# Patient Record
Sex: FEMALE | Race: BLACK OR AFRICAN AMERICAN | NOT HISPANIC OR LATINO | Employment: UNEMPLOYED | URBAN - METROPOLITAN AREA
[De-identification: names, ages, dates, MRNs, and addresses within clinical notes are randomized per-mention and may not be internally consistent; named-entity substitution may affect disease eponyms.]

---

## 2024-08-02 ENCOUNTER — HOSPITAL ENCOUNTER (OUTPATIENT)
Dept: RADIOLOGY | Facility: HOSPITAL | Age: 59
Discharge: HOME | End: 2024-08-02
Attending: NURSE PRACTITIONER
Payer: MEDICAID

## 2024-08-02 ENCOUNTER — OFFICE VISIT (OUTPATIENT)
Dept: FAMILY MEDICINE | Facility: CLINIC | Age: 59
End: 2024-08-02
Payer: MEDICAID

## 2024-08-02 VITALS
TEMPERATURE: 98 F | SYSTOLIC BLOOD PRESSURE: 122 MMHG | OXYGEN SATURATION: 97 % | BODY MASS INDEX: 44.85 KG/M2 | DIASTOLIC BLOOD PRESSURE: 76 MMHG | RESPIRATION RATE: 18 BRPM | HEIGHT: 62 IN | WEIGHT: 243.69 LBS | HEART RATE: 61 BPM

## 2024-08-02 DIAGNOSIS — E66.9 OBESITY, UNSPECIFIED CLASSIFICATION, UNSPECIFIED OBESITY TYPE, UNSPECIFIED WHETHER SERIOUS COMORBIDITY PRESENT: ICD-10-CM

## 2024-08-02 DIAGNOSIS — G89.29 CHRONIC PAIN OF LEFT ANKLE: ICD-10-CM

## 2024-08-02 DIAGNOSIS — M25.572 CHRONIC PAIN OF LEFT ANKLE: ICD-10-CM

## 2024-08-02 DIAGNOSIS — Z13.31 POSITIVE DEPRESSION SCREENING: ICD-10-CM

## 2024-08-02 DIAGNOSIS — Z00.00 WELL ADULT EXAM: ICD-10-CM

## 2024-08-02 DIAGNOSIS — Z76.89 ENCOUNTER TO ESTABLISH CARE: Primary | ICD-10-CM

## 2024-08-02 PROCEDURE — 73610 X-RAY EXAM OF ANKLE: CPT | Mod: TC,LT

## 2024-08-02 RX ORDER — DICLOFENAC SODIUM 50 MG/1
50 TABLET, DELAYED RELEASE ORAL 2 TIMES DAILY PRN
Qty: 60 TABLET | Refills: 3 | Status: SHIPPED | OUTPATIENT
Start: 2024-08-02

## 2024-08-02 RX ORDER — METFORMIN HYDROCHLORIDE 500 MG/1
500 TABLET, EXTENDED RELEASE ORAL 2 TIMES DAILY WITH MEALS
COMMUNITY
Start: 2024-04-09

## 2024-08-02 NOTE — PATIENT INSTRUCTIONS
Kiel Rodriguez,     If you are due for any health screening(s) below please notify me so we can arrange them to be ordered and scheduled. Most healthy patients at your age complete them, but you are free to accept or refuse.     If you can't do it, I'll definitely understand. If you can, I'd certainly appreciate it!    Tests to Keep You Healthy    Mammogram: DUE  Colon Cancer Screening: DUE      Schedule your breast cancer screening today     Breast cancer is the second most common cancer in women,  and the second leading cause of death from cancer. Mammograms can detect breast cancer early, which significantly increases the chances of curing the cancer.       Our records indicate that you may be overdue for breast cancer screening. Cancer screenings save lives, so schedule yours today to stay healthy.     If you recently had a mammogram performed outside of [facility], please let your Health care team know so that they can update your health record.        Its time for your colon cancer screening     Colorectal cancer is one of the leading causes of cancer death for men and women but it doesnt have to be. Screenings can prevent colorectal cancer or find it early enough to treat and cure the disease.     Our records indicate that you may be overdue for colon cancer screening. A colonoscopy or stool screening test can help identify patients at risk for developing colon cancer. Cancer screenings save lives, so schedule yours today to stay healthy.     A colonoscopy is the preferred test for detecting colon cancer. It is needed only once every 10 years if results are negative. While you are sedated, a flexible, lighted tube with a tiny camera is inserted into the rectum and advanced through the colon to look for cancers.     An alternative screening test that is used at home and returned to the lab may also be used. It detects hidden blood in bowel movements which could indicate cancer in the colon. If results  are positive, you will need a colonoscopy to determine if the blood is a sign of cancer. This type of follow up (diagnostic) colonoscopy usually requires additional copays as required by your insurance provider.     If you recently had your colon cancer screening performed outside of [facility], please let your Health care team know so that they can update your health record. Please contact your PCP if you have any questions.

## 2024-08-02 NOTE — PROGRESS NOTES
"   Patient ID: [id number]     Chief Complaint: Establish Care      HPI:     Jennifer Jackson is a 58 y.o. female here today to establish care.  [place]  She presents with complaints of left chronic left ankle pain.  Pain is worse with ambulation.  Patient rates pain 4/10.  Patient describes pain as an intermittent, sharp, shooting pain.  Patient denies any trauma or injury.  Patient reports relief with ankle boot.  Denies relief with ibuprofen.        Health Maintenance   Topic Date Due    Hepatitis C Screening  Never done    Mammogram  Never done    TETANUS VACCINE  08/11/2007    Colorectal Cancer Screening  Never done    Shingles Vaccine (1 of 2) Never done    Lipid Panel  08/02/2029       Past Medical History:  has a past medical history of Obesity, unspecified.    Surgical History:  has a past surgical history that includes Tubal ligation; Hysterectomy; Tonsillectomy; [place] tooth extraction; and Vaginal delivery.    Family History: family history is not on file.    Social History:  reports that she has never smoked. She has never used smokeless tobacco. She reports current alcohol use. She reports that she does not use drugs.    Current Outpatient Medications   Medication Instructions    diclofenac (VOLTAREN) 50 mg, Oral, 2 times daily PRN    metFORMIN (GLUCOPHAGE-XR) 500 mg, Oral, 2 times daily with meals       Patient is allergic to ketorolac and tromethamine.     Patient Care Team:  Tere Gilbert FNP as PCP - General (Family Medicine)       Subjective:     Review of Systems    12 point review of systems conducted, negative except as stated in the history of present illness. See HPI for details.      Objective:     Visit Vitals  /76 (BP Location: Left arm, Patient Position: Sitting)   Pulse 61   Temp 98.1 °F (36.7 °C) (Oral)   Resp 18   Ht 5' 2" (1.575 m)   Wt 110.5 kg (243 lb 11.2 oz)   SpO2 97%   BMI 44.57 kg/m²       Physical Exam    General: Alert and oriented, No " "acute distress.  Head: Normocephalic, Atraumatic.  Eye: Pupils are equal, round and reactive to light, Extraocular movements are intact, Sclera non-icteric.  Ears/Nose/Throat: Normal, Mucosa moist,Clear.  Neck/Thyroid: Supple, Non-tender, No carotid bruit, No lymphadenopathy, No JVD, Full range of motion.  Respiratory: Clear to auscultation bilaterally; No wheezes, rales or rhonchi,Non-labored respirations, Symmetrical chest wall expansion.  Cardiovascular: Regular rate and rhythm, S1/S2 normal, No murmurs, rubs or gallops.  Gastrointestinal: Soft, Non-tender, Non-distended, Normal bowel sounds, No palpable organomegaly.  Musculoskeletal:  Left foot boot in place.  Integumentary: Warm, Dry, Intact, No suspicious lesions or rashes.  Extremities: No clubbing, cyanosis or edema  Neurologic: No focal deficits, Cranial Nerves II-XII are grossly intact, Motor strength normal upper and lower extremities, Sensory exam intact.  Psychiatric: Normal interaction, Coherent speech, Euthymic mood, Appropriate affect     Labs Reviewed:     Chemistry:  Lab Results   Component Value Date     08/02/2024    K 4.4 08/02/2024    BUN 22.3 (H) 08/02/2024    CREATININE 0.82 08/02/2024    EGFRNORACEVR >60 08/02/2024    GLUCOSE 132 (H) 08/02/2024    CALCIUM 9.2 08/02/2024    ALKPHOS 83 08/02/2024    LABPROT 7.7 08/02/2024    ALBUMIN 3.9 08/02/2024    AST 15 08/02/2024    ALT 14 08/02/2024        Lab Results   Component Value Date    HGBA1C 6.3 08/02/2024        Hematology:  Lab Results   Component Value Date    WBC 7.71 08/02/2024    HGB 13.5 08/02/2024    HCT 42.0 08/02/2024     08/02/2024       Lipid Panel:  Lab Results   Component Value Date    CHOL 181 08/02/2024    HDL 44 08/02/2024    .00 08/02/2024    TRIG 107 08/02/2024    TOTALCHOLEST 4 08/02/2024        Urine:  No results found for: "COLORUA", "APPEARANCEUA", "SGUA", "PHUA", "PROTEINUA", "GLUCOSEUA", "KETONESUA", "BLOODUA", "NITRITESUA", "LEUKOCYTESUR", "RBCUA", " ""WBCUA", "BACTERIA", "SQEPUA", "HYALINECASTS", "CREATRANDUR", "PROTEINURINE", "UPROTCREA"       Assessment:       ICD-10-CM ICD-9-CM   1. Encounter to establish care  Z76.89 V65.8   2. Positive depression screening  Z13.31 796.4   3. Chronic pain of left ankle  M25.572 719.47    G89.29 338.29   4. Well adult exam  Z00.00 V70.0   5. Obesity, unspecified classification, unspecified obesity type, unspecified whether serious comorbidity present  E66.9 278.00        Plan:   1. Encounter to establish care    2. Positive depression screening  Stable.  ED precautions given.  Discussed benefits of medication not becoming noticeable until up to 6 weeks from start date.   Exercise daily. Get sunlight daily.  Practice positive phrases and repeat throughout the day, along with yoga and relaxation techniques.  Establish good social support, make changes to reduce stress.  Reports any symptoms of suicidal/homicidal ideations or self harm immediately, if clinic is closed go to nearest emergency room.    3. Chronic pain of left ankle  - X-Ray Ankle Complete Left; Future  - diclofenac (VOLTAREN) 50 MG EC tablet; Take 1 tablet (50 mg total) by mouth 2 (two) times daily as needed (pain).  Dispense: 60 tablet; Refill: 3  - Ambulatory referral/consult to Orthopedics; Future    4. Well adult exam  - CBC Auto Differential; Future  - Comprehensive Metabolic Panel; Future  - Lipid Panel; Future  - TSH; Future  - Hemoglobin A1C; Future  - Urinalysis; Future  - Vitamin D; Future  - Microalbumin/Creatinine Ratio, Urine; Future  - Urinalysis     5. Obesity, unspecified classification, unspecified obesity type, unspecified whether serious comorbidity present  Body mass index is 44.57 kg/m².  Goal BMI <30.  Exercise 5 times a week for 30 minutes per day.  Avoid soda, simple sugars, excessive rice, potatoes or bread. Limit fast foods and fried foods.  Choose complex carbs in moderation (example: green vegetables, beans, oatmeal). Eat plenty of " fresh fruits and vegetables with lean meats daily.  Do not skip meals. Eat a balanced portion size.  Avoid fad diets. Consider permanent healthy life style changes.       Follow up in about 6 weeks (around 9/13/2024) for Wellness. In addition to their scheduled follow up, the patient has also been instructed to follow up on as needed basis.     Future Appointments   Date Time Provider Department Center   10/16/2024  3:00 PM Tere Gilbert FNP [facility]        [facility] Henry

## 2024-08-05 ENCOUNTER — TELEPHONE (OUTPATIENT)
Dept: FAMILY MEDICINE | Facility: CLINIC | Age: 59
End: 2024-08-05
Payer: MEDICAID

## 2024-08-05 DIAGNOSIS — M25.572 CHRONIC PAIN OF LEFT ANKLE: Primary | ICD-10-CM

## 2024-08-05 DIAGNOSIS — G89.29 CHRONIC PAIN OF LEFT ANKLE: Primary | ICD-10-CM

## 2024-08-19 ENCOUNTER — TELEPHONE (OUTPATIENT)
Dept: FAMILY MEDICINE | Facility: CLINIC | Age: 59
End: 2024-08-19
Payer: MEDICAID

## 2024-08-19 NOTE — TELEPHONE ENCOUNTER
----- Message from Liudmila Rees sent at 8/19/2024 10:59 AM CDT -----  Regarding: results  .Who Called: Jennifer Morrison    Caller is requesting information on test results.    Name of Test (Lab/Mammo/Etc): labs  Date of Test: 8/2/24  Where the test was performed: [facility]  Ordering Provider: Ofelia    Preferred Method of Contact: Phone Call  Patient's Preferred Phone Number on File: [phone]   Best Call Back Number, if different:  Additional Information: pt requesting a call back

## 2024-09-05 ENCOUNTER — OFFICE VISIT (OUTPATIENT)
Dept: ORTHOPEDICS | Facility: CLINIC | Age: 59
End: 2024-09-05
Payer: MEDICAID

## 2024-09-05 ENCOUNTER — HOSPITAL ENCOUNTER (OUTPATIENT)
Dept: RADIOLOGY | Facility: HOSPITAL | Age: 59
Discharge: HOME | End: 2024-09-05
Payer: MEDICAID

## 2024-09-05 VITALS
WEIGHT: 240.31 LBS | BODY MASS INDEX: 44.22 KG/M2 | HEIGHT: 62 IN | TEMPERATURE: 98 F | HEART RATE: 75 BPM | SYSTOLIC BLOOD PRESSURE: 128 MMHG | DIASTOLIC BLOOD PRESSURE: 79 MMHG

## 2024-09-05 DIAGNOSIS — M25.572 CHRONIC PAIN OF LEFT ANKLE: ICD-10-CM

## 2024-09-05 DIAGNOSIS — G89.29 CHRONIC PAIN OF LEFT ANKLE: ICD-10-CM

## 2024-09-05 DIAGNOSIS — M76.822 INSUFFICIENCY OF LEFT POSTERIOR TIBIAL TENDON: Primary | ICD-10-CM

## 2024-09-05 PROCEDURE — 73630 X-RAY EXAM OF FOOT: CPT | Mod: TC,LT

## 2024-09-05 PROCEDURE — 99214 OFFICE O/P EST MOD 30 MIN: CPT | Mod: PBBFAC,25

## 2024-09-05 PROCEDURE — 73610 X-RAY EXAM OF ANKLE: CPT | Mod: TC,LT

## 2024-09-05 NOTE — PROGRESS NOTES
"  Subjective:    Patient ID: Jennifer Jackson is a 58 y.o. female  who presented to [facility] for new visit..      Chief Complaint: Pain of the Left Ankle      History of Present Illness:  Jennifer Jackson presents to the clinic due to chronic left ankle/foot pain x years. presented today with anterior dorsal ankle pain and medial ankle pain. She ranks it a 4 out of 10 pain currently, increased over the last few months slightly. She describes the pain as constant ache. She denies any inciting event. Pain is aggravated by standing or extended periods of time and when she sleeps (she does sleep on her stomach at night). Patient was started on diclofenac po by her PCP but she had a rash secondary to the medication therefore it was discontinued. Patient has had no prior ankle/foot problems other than plantar fascitis that was treated with a walking boot about 2 hears ago. Evaluation to date: plain films and PCP evaluation. Treatment to date: avoidance of activity and oral analgesics. Expectations for today's visit includes discuss options to help decrease constant pain. PCP is JOEY Rosales.    Ankle/Foot Review of Systems:  Swelling?  no  Instability?  no  Mechanical sx?  no  <30 min AM stiffness? yes  Limited ROM? no  Fever/Chills? no      Objective:      Physical Exam:    /79   Pulse 75   Temp 97.6 °F (36.4 °C)   Ht 5' 2" (1.575 m)   Wt 109 kg (240 lb 4.8 oz)   BMI 43.95 kg/m²     Ortho/SPM Exam    Appearance:  Normal gait/station  FWB      Soft tissue swelling: Left: no Right: no  Effusion: Left:  Negative Right: Negative  Erythema: Left no Right: no  Ecchymosis: Left: no Right: no  Atrophy: Left: no Right: no    Palpation:  Ankle/Foot Tenderness: Left: dorsal, medial, and lateral medial malleolus Right: non tender.    Range of motion:  Ankle dorsiflexion (20 degrees):     Left: 20 Right: 20  Ankle Plantar flexion (50 degrees): Left 50 Right: " 50  Subtalar inversion (5 degrees): Left: 5 Right 5  Subtalar eversion (5 degrees):  Left: 5 Right 5  Transverse/midtarsal: adduction (20 degrees): Left: 20 Right: 20  Transverse/midtarsal abduction (10 degrees): Left: 10 Right: 10    Strength:  Foot inversion/dorsiflexion (Deep Peroneal N. L4):Left: 5/5  Pain: yes Right: 5/5  Pain: no  1st toe Dorsiflexion (Deep Peroneal N. L5): Left: 5/5  Pain: no Right: 5/5  Pain: no  Foot plantarflexion (Tibial N. S1): Left: 5/5  Pain: no Right: 5/5  Pain: no  Foot eversion (superficial peroneal L4-S1): Left: 5/5  Pain: yes Right: 5/5  Pain: no      Special Tests:  Mcgill:  Left: Not performed     Right: Not performed   Anterior drawer: Left: Negative     Right: Negative   Talar tilt: Left: Negative      Right: Negative   Squeeze: Left: Negative  Right: Negative  Heel rise: Left: Not performed Right: Not performed  Too many toes sign: Left: Positive Right: Negative      General appearance: NAD  Peripheral pulses: normal bilaterally   Reflexes: Left: normal Right normal   Sensation: normal    Labs:  Last A1c: 6.3     Imaging:   Previous images reviewed.  X-rays ordered and performed today: yes  # of views: 3 Laterality: left  My Interpretation:  no abnormality. No obvious fracture, abnormal joint space widening/narrowing, cortical defect or obvious soft tissue injury.     Assessment:        Encounter Diagnoses   Code Name Primary?    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 Prior external referring provider studies reviewed.   Dx:    Treatment Plan:   -Discussed with patient diagnosis, prognosis, and treatment recommendations.   -Over the counter NSAID and/or tylenol provided you do not have contraindications such as but not limited to liver or kidney disease or uncontrolled blood pressure. If you're doctors have told you to to not take them based on your health, do not take them.    -Discussed with the patient to begin using insoles in her shoes, she should be in these insoles whenever she is ambulating no matter if she is in the her home or outside.   -PT script given to the patient today  Imaging: radiological studies ordered and independently reviewed; discussed with patient; pending radiologist interpretation.   Weight Management: is paramount. Recommend to discuss with PCP about medication and bariatric surgery options for weight loss if your BMI is >35 and applicable. A BMI of <24.9 may provide further relief..   Activity: Activity as tolerated; HEP to include aerobic conditioning and strength training with non-painful activity. ROM/STG exercises. Proper footware.   Therapy: Physical Therapy  Medication: START over-the-counter acetaminophen (Tylenol 1000 mg three times per day as needed)  CONTINUE over-the-counter NSAIDs (ibuprofen 200mg three tablets three times a day as needed). Please see your primary care physician for further refills.  RTC: 3 months.      This note is dictated using the M*Modal Fluency Direct word recognition program. There are word recognition mistakes that are occasionally missed on review.     Lashay Ovalle MD  Sports Medicine Fellow

## 2024-10-10 ENCOUNTER — TELEPHONE (OUTPATIENT)
Dept: FAMILY MEDICINE | Facility: CLINIC | Age: 59
End: 2024-10-10
Payer: MEDICAID

## 2024-10-16 ENCOUNTER — LAB VISIT (OUTPATIENT)
Dept: LAB | Facility: HOSPITAL | Age: 59
End: 2024-10-16
Attending: NURSE PRACTITIONER
Payer: MEDICAID

## 2024-10-16 ENCOUNTER — OFFICE VISIT (OUTPATIENT)
Dept: FAMILY MEDICINE | Facility: CLINIC | Age: 59
End: 2024-10-16
Payer: MEDICAID

## 2024-10-16 VITALS
HEART RATE: 83 BPM | DIASTOLIC BLOOD PRESSURE: 85 MMHG | HEIGHT: 62 IN | WEIGHT: 246.5 LBS | TEMPERATURE: 98 F | SYSTOLIC BLOOD PRESSURE: 129 MMHG | BODY MASS INDEX: 45.36 KG/M2

## 2024-10-16 DIAGNOSIS — J30.2 SEASONAL ALLERGIES: ICD-10-CM

## 2024-10-16 DIAGNOSIS — Z12.31 ENCOUNTER FOR SCREENING MAMMOGRAM FOR BREAST CANCER: ICD-10-CM

## 2024-10-16 DIAGNOSIS — E66.9 OBESITY, UNSPECIFIED CLASS, UNSPECIFIED OBESITY TYPE, UNSPECIFIED WHETHER SERIOUS COMORBIDITY PRESENT: ICD-10-CM

## 2024-10-16 DIAGNOSIS — Z23 FLU VACCINE NEED: ICD-10-CM

## 2024-10-16 DIAGNOSIS — R73.03 PRE-DIABETES: ICD-10-CM

## 2024-10-16 DIAGNOSIS — Z00.00 WELLNESS EXAMINATION: Primary | ICD-10-CM

## 2024-10-16 DIAGNOSIS — M62.838 NECK MUSCLE SPASM: ICD-10-CM

## 2024-10-16 LAB
EST. AVERAGE GLUCOSE BLD GHB EST-MCNC: 139.9 MG/DL
HBA1C MFR BLD: 6.5 %

## 2024-10-16 PROCEDURE — 99396 PREV VISIT EST AGE 40-64: CPT | Mod: 25,,, | Performed by: NURSE PRACTITIONER

## 2024-10-16 PROCEDURE — 1160F RVW MEDS BY RX/DR IN RCRD: CPT | Mod: CPTII,,, | Performed by: NURSE PRACTITIONER

## 2024-10-16 PROCEDURE — 3008F BODY MASS INDEX DOCD: CPT | Mod: CPTII,,, | Performed by: NURSE PRACTITIONER

## 2024-10-16 PROCEDURE — 3074F SYST BP LT 130 MM HG: CPT | Mod: CPTII,,, | Performed by: NURSE PRACTITIONER

## 2024-10-16 PROCEDURE — 3044F HG A1C LEVEL LT 7.0%: CPT | Mod: CPTII,,, | Performed by: NURSE PRACTITIONER

## 2024-10-16 PROCEDURE — 1159F MED LIST DOCD IN RCRD: CPT | Mod: CPTII,,, | Performed by: NURSE PRACTITIONER

## 2024-10-16 PROCEDURE — 90656 IIV3 VACC NO PRSV 0.5 ML IM: CPT | Mod: ,,, | Performed by: NURSE PRACTITIONER

## 2024-10-16 PROCEDURE — 90471 IMMUNIZATION ADMIN: CPT | Mod: ,,, | Performed by: NURSE PRACTITIONER

## 2024-10-16 PROCEDURE — 3079F DIAST BP 80-89 MM HG: CPT | Mod: CPTII,,, | Performed by: NURSE PRACTITIONER

## 2024-10-16 PROCEDURE — 83036 HEMOGLOBIN GLYCOSYLATED A1C: CPT

## 2024-10-16 PROCEDURE — 36415 COLL VENOUS BLD VENIPUNCTURE: CPT

## 2024-10-16 RX ORDER — CYCLOBENZAPRINE HCL 5 MG
5 TABLET ORAL 3 TIMES DAILY PRN
Qty: 15 TABLET | Refills: 0 | Status: SHIPPED | OUTPATIENT
Start: 2024-10-16 | End: 2024-10-21

## 2024-10-16 RX ORDER — LORATADINE 10 MG/1
10 TABLET ORAL DAILY
Qty: 30 TABLET | Refills: 11 | Status: SHIPPED | OUTPATIENT
Start: 2024-10-16 | End: 2025-10-16

## 2024-10-16 RX ORDER — FLUTICASONE PROPIONATE 50 MCG
1 SPRAY, SUSPENSION (ML) NASAL 2 TIMES DAILY
Qty: 18.2 ML | Refills: 11 | Status: SHIPPED | OUTPATIENT
Start: 2024-10-16 | End: 2025-10-16

## 2024-10-16 NOTE — LETTER
AUTHORIZATION FOR RELEASE OF   CONFIDENTIAL INFORMATION    Dear [facility],    We are seeing Jennifer Jackson, date of birth 1965, in the clinic at [facility]. Tere Gilbert FNP is the patient's PCP. Jennifer Jackson has an outstanding lab/procedure at the time we reviewed her chart. In order to help keep her health information updated, she has authorized us to request the following medical record(s):        (  )  MAMMOGRAM                                      ( x )  COLONOSCOPY      (  )  PAP SMEAR                                          (  )  OUTSIDE LAB RESULTS     (  )  DEXA SCAN                                          (  )  EYE EXAM            (  )  FOOT EXAM                                          ( x )  ENTIRE RECORD     (  )  OUTSIDE IMMUNIZATIONS                 (  )  _______________         Please fax records to [facility], [place], [place], JOEY, [phone]     If you have any questions, please contact Eri Ortiz  at  [phone].           Patient Name: Jennifer Jackson  : 1965  Patient Phone #: [phone]

## 2024-10-17 ENCOUNTER — TELEPHONE (OUTPATIENT)
Dept: FAMILY MEDICINE | Facility: CLINIC | Age: 59
End: 2024-10-17
Payer: MEDICAID

## 2024-10-21 NOTE — PROGRESS NOTES
Patient ID: [id number]     Chief Complaint: wellness with labs       HPI:     Jennifer Jackson is a 58 y.o. female here today for an annual wellness visit.  She presents with complaints of muscle spasms to her neck.  Denies any trauma or injury.  Denies any numbness or tingling.    Health Maintenance   Topic Date Due    Hepatitis C Screening  Never done    Mammogram  Never done    TETANUS VACCINE  08/11/2007    Colorectal Cancer Screening  Never done    Shingles Vaccine (1 of 2) Never done    Lipid Panel  08/02/2029     Dental Exam - Recommend biannually  Vaccinations -   Immunization History   Administered Date(s) Administered    DT (Pediatric) 06/24/1976    DTP 03/12/1970, 06/25/1970, 07/30/1970, 08/24/1972    IPV 03/12/1970, 06/25/1970, 08/24/1972, 06/24/1976    Influenza - Trivalent - Fluarix, Flulaval, Fluzone, Afluria - PF 10/16/2024    Measles 07/02/1970    Measles / Rubella 05/23/1973    Rubella 01/14/1991    Tdap 03/21/1987, 08/11/1997        Past Medical History:  has a past medical history of Obesity, unspecified.    Surgical History:  has a past surgical history that includes Tubal ligation; Hysterectomy; Tonsillectomy; [place] tooth extraction; Vaginal delivery; and left wrist tendonitis.    Family History: family history includes Arthritis in her mother; Cancer in her father; Diabetes in her father and mother.    Social History:  reports that she has never smoked. She has never used smokeless tobacco. She reports current alcohol use. She reports that she does not use drugs.    Current Outpatient Medications   Medication Instructions    fluticasone propionate (FLONASE) 50 mcg, Each Nostril, 2 times daily    loratadine (CLARITIN) 10 mg, Oral, Daily       Patient is allergic to diclofenac, ketorolac, and tromethamine.     Patient Care Team:  Tere Gilbert FNP as PCP - General (Family Medicine)       Subjective:     Review of Systems    12 point review of systems conducted, negative except as stated  "in the history of present illness. See HPI for details.      Objective:     Visit Vitals  /85   Pulse 83   Temp 97.9 °F (36.6 °C)   Ht 5' 2" (1.575 m)   Wt 111.8 kg (246 lb 8 oz)   SpO2 (P) 97%   BMI 45.09 kg/m²       Physical Exam    General: Alert and oriented, No acute distress.  Head: Normocephalic, Atraumatic.  Eye: Pupils are equal, round and reactive to light, Extraocular movements are intact, Sclera non-icteric.  Ears/Nose/Throat:  Bilateral ear effusions, postnasal drip.  Neck/Thyroid: Supple, Non-tender, No carotid bruit, No lymphadenopathy, No JVD, Full range of motion.  Respiratory: Clear to auscultation bilaterally; No wheezes, rales or rhonchi,Non-labored respirations, Symmetrical chest wall expansion.  Cardiovascular: Regular rate and rhythm, S1/S2 normal, No murmurs, rubs or gallops.  Gastrointestinal: Soft, Non-tender, Non-distended, Normal bowel sounds, No palpable organomegaly.  Musculoskeletal: Normal range of motion.  Integumentary: Warm, Dry, Intact, No suspicious lesions or rashes.  Extremities: No clubbing, cyanosis or edema  Neurologic: No focal deficits, Cranial Nerves II-XII are grossly intact, Motor strength normal upper and lower extremities, Sensory exam intact.  Psychiatric: Normal interaction, Coherent speech, Euthymic mood, Appropriate affect     Labs Reviewed:     Chemistry:  Lab Results   Component Value Date     08/02/2024    K 4.4 08/02/2024    BUN 22.3 (H) 08/02/2024    CREATININE 0.82 08/02/2024    EGFRNORACEVR >60 08/02/2024    GLUCOSE 132 (H) 08/02/2024    CALCIUM 9.2 08/02/2024    ALKPHOS 83 08/02/2024    LABPROT 7.7 08/02/2024    ALBUMIN 3.9 08/02/2024    AST 15 08/02/2024    ALT 14 08/02/2024    VKLTHYNM19AP 37 08/02/2024    TSH 1.133 08/02/2024        Lab Results   Component Value Date    HGBA1C 6.5 10/16/2024        Hematology:  Lab Results   Component Value Date    WBC 7.71 08/02/2024    HGB 13.5 08/02/2024    HCT 42.0 08/02/2024     08/02/2024 "       Lipid Panel:  Lab Results   Component Value Date    CHOL 181 08/02/2024    HDL 44 08/02/2024    .00 08/02/2024    TRIG 107 08/02/2024    TOTALCHOLEST 4 08/02/2024        Urine:  Lab Results   Component Value Date    CREATRANDUR 333.0 (H) 08/02/2024          Assessment:       ICD-10-CM ICD-9-CM   1. Wellness examination  Z00.00 V70.0   2. Pre-diabetes  R73.03 790.29   3. Neck muscle spasm  M62.838 728.85   4. Encounter for screening mammogram for breast cancer  Z12.31 V76.12   5. Flu vaccine need  Z23 V04.81   6. Seasonal allergies  J30.2 477.9   7. Obesity, unspecified class, unspecified obesity type, unspecified whether serious comorbidity present  E66.9 278.00        Plan:   1. Wellness examination (Primary)  Healthy lifestyle discussed.    2. Pre-diabetes  Lab Results   Component Value Date    HGBA1C 6.5 10/16/2024    HGBA1C 6.3 08/02/2024    .00 08/02/2024    CREATININE 0.82 08/02/2024   Not at goal.  Patient would like to continue trying diet and lifestyle changes before getting back on medication.  Education provided.  Follow ADA Diet. Avoid soda, simple sweets, and limit rice/pasta/breads/starches (no more than 45-50 grams per meal).  Maintain healthy weight with goal BMI <30.  Exercise 5 times per week for 30 minutes per day.  Stressed importance of daily foot exams.  Stressed importance of annual dilated eye exam.    - Hemoglobin A1C; Future    3. Neck muscle spasm  - cyclobenzaprine (FLEXERIL) 5 MG tablet; Take 1 tablet (5 mg total) by mouth 3 (three) times daily as needed for Muscle spasms.  Dispense: 15 tablet; Refill: 0    4. Encounter for screening mammogram for breast cancer  - Mammo Digital Screening Bilat w/ Jhonathan; Future    5. Flu vaccine need  - influenza (Flulaval, Fluzone, Fluarix) 45 mcg/0.5 mL IM vaccine (> or = 6 mo) 0.5 mL    6. Seasonal allergies  - loratadine (CLARITIN) 10 mg tablet; Take 1 tablet (10 mg total) by mouth once daily.  Dispense: 30 tablet; Refill: 11  -  fluticasone propionate (FLONASE) 50 mcg/actuation nasal spray; 1 spray (50 mcg total) by Each Nostril route 2 (two) times a day.  Dispense: 18.2 mL; Refill: 11    7. Obesity, unspecified class, unspecified obesity type, unspecified whether serious comorbidity present  Body mass index is 45.09 kg/m².  Goal BMI <30.  Exercise 5 times a week for 30 minutes per day.  Avoid soda, simple sugars, excessive rice, potatoes or bread. Limit fast foods and fried foods.  Choose complex carbs in moderation (example: green vegetables, beans, oatmeal). Eat plenty of fresh fruits and vegetables with lean meats daily.  Do not skip meals. Eat a balanced portion size.  Avoid fad diets. Consider permanent healthy life style changes.       Follow up in about 3 months (around 1/16/2025) for DM. In addition to their scheduled follow up, the patient has also been instructed to follow up on as needed basis.     Future Appointments   Date Time Provider Department Center   11/1/2024 11:00 AM BAHMAN LIAO [facility] ALIYA Membreno    1/17/2025  9:15 AM Tere Gilbert FNP [facility] TIMOTHY Joy    10/23/2025  2:30 PM Tere Gilbert FNP [facility] Jaspreet         JOEY Henry

## 2024-11-01 ENCOUNTER — HOSPITAL ENCOUNTER (OUTPATIENT)
Dept: RADIOLOGY | Facility: HOSPITAL | Age: 59
Discharge: HOME | End: 2024-11-01
Attending: NURSE PRACTITIONER
Payer: MEDICAID

## 2024-11-01 DIAGNOSIS — Z12.31 ENCOUNTER FOR SCREENING MAMMOGRAM FOR BREAST CANCER: ICD-10-CM

## 2024-11-01 PROCEDURE — 77067 SCR MAMMO BI INCL CAD: CPT | Mod: TC

## 2025-01-13 ENCOUNTER — LAB VISIT (OUTPATIENT)
Dept: LAB | Facility: HOSPITAL | Age: 60
End: 2025-01-13
Attending: NURSE PRACTITIONER
Payer: MEDICAID

## 2025-01-13 DIAGNOSIS — R73.03 PRE-DIABETES: ICD-10-CM

## 2025-01-13 DIAGNOSIS — R73.03 PRE-DIABETES: Primary | ICD-10-CM

## 2025-01-13 LAB
EST. AVERAGE GLUCOSE BLD GHB EST-MCNC: 131.2 MG/DL
HBA1C MFR BLD: 6.2 %

## 2025-01-13 PROCEDURE — 83036 HEMOGLOBIN GLYCOSYLATED A1C: CPT

## 2025-01-13 PROCEDURE — 36415 COLL VENOUS BLD VENIPUNCTURE: CPT

## 2025-01-17 ENCOUNTER — PATIENT OUTREACH (OUTPATIENT)
Facility: CLINIC | Age: 60
End: 2025-01-17
Payer: MEDICAID

## 2025-01-17 ENCOUNTER — OFFICE VISIT (OUTPATIENT)
Dept: FAMILY MEDICINE | Facility: CLINIC | Age: 60
End: 2025-01-17
Payer: MEDICAID

## 2025-01-17 VITALS
TEMPERATURE: 98 F | HEIGHT: 62 IN | SYSTOLIC BLOOD PRESSURE: 127 MMHG | HEART RATE: 82 BPM | DIASTOLIC BLOOD PRESSURE: 73 MMHG | WEIGHT: 249.13 LBS | RESPIRATION RATE: 18 BRPM | OXYGEN SATURATION: 98 % | BODY MASS INDEX: 45.84 KG/M2

## 2025-01-17 DIAGNOSIS — M25.572 CHRONIC PAIN OF LEFT ANKLE: ICD-10-CM

## 2025-01-17 DIAGNOSIS — G89.29 CHRONIC PAIN OF LEFT ANKLE: ICD-10-CM

## 2025-01-17 DIAGNOSIS — R73.03 PRE-DIABETES: Primary | ICD-10-CM

## 2025-01-17 DIAGNOSIS — E66.9 OBESITY, UNSPECIFIED CLASS, UNSPECIFIED OBESITY TYPE, UNSPECIFIED WHETHER SERIOUS COMORBIDITY PRESENT: ICD-10-CM

## 2025-01-17 PROCEDURE — 3008F BODY MASS INDEX DOCD: CPT | Mod: CPTII,,, | Performed by: NURSE PRACTITIONER

## 2025-01-17 PROCEDURE — 99214 OFFICE O/P EST MOD 30 MIN: CPT | Mod: ,,, | Performed by: NURSE PRACTITIONER

## 2025-01-17 PROCEDURE — 3044F HG A1C LEVEL LT 7.0%: CPT | Mod: CPTII,,, | Performed by: NURSE PRACTITIONER

## 2025-01-17 PROCEDURE — 3074F SYST BP LT 130 MM HG: CPT | Mod: CPTII,,, | Performed by: NURSE PRACTITIONER

## 2025-01-17 PROCEDURE — 1159F MED LIST DOCD IN RCRD: CPT | Mod: CPTII,,, | Performed by: NURSE PRACTITIONER

## 2025-01-17 PROCEDURE — 1160F RVW MEDS BY RX/DR IN RCRD: CPT | Mod: CPTII,,, | Performed by: NURSE PRACTITIONER

## 2025-01-17 PROCEDURE — 3078F DIAST BP <80 MM HG: CPT | Mod: CPTII,,, | Performed by: NURSE PRACTITIONER

## 2025-01-17 PROCEDURE — G2211 COMPLEX E/M VISIT ADD ON: HCPCS | Mod: ,,, | Performed by: NURSE PRACTITIONER

## 2025-01-17 RX ORDER — METFORMIN HYDROCHLORIDE 500 MG/1
500 TABLET, EXTENDED RELEASE ORAL
COMMUNITY
End: 2025-01-17 | Stop reason: SDUPTHER

## 2025-01-17 RX ORDER — MELOXICAM 15 MG/1
15 TABLET ORAL DAILY
Qty: 30 TABLET | Refills: 3 | Status: SHIPPED | OUTPATIENT
Start: 2025-01-17

## 2025-01-17 RX ORDER — METFORMIN HYDROCHLORIDE 500 MG/1
500 TABLET, EXTENDED RELEASE ORAL
Qty: 60 TABLET | Refills: 6 | Status: SHIPPED | OUTPATIENT
Start: 2025-01-17

## 2025-01-17 NOTE — ASSESSMENT & PLAN NOTE

## 2025-01-17 NOTE — PROGRESS NOTES
Patient ID: [id number]     Chief Complaint: Diabetes (3 mo f/u)      HPI:     Jennifer Jackson is a 59 y.o. female here today for a DM follow up.  Presents with complaints of arthritis to her left lower back and left ankle.  Patient denies relief with ibuprofen.  Patient reports history of back surgery in 2012.  Patient rates pain 4/10.  Denies saddle anesthesia or bowel or bladder incontinence.    Health Maintenance   Topic Date Due    Hepatitis C Screening  Never done    HIV Screening  Never done    TETANUS VACCINE  08/11/2007    Colorectal Cancer Screening  Never done    Shingles Vaccine (1 of 2) Never done    Pneumococcal Vaccines (Age 50+) (1 of 1 - PCV) Never done    COVID-19 Vaccine (1 - 2024-25 season) Never done    Mammogram  11/27/2025    Hemoglobin A1c (Diabetic Prevention Screening)  01/13/2028    Lipid Panel  08/02/2029    RSV Vaccine (Age 60+ and Pregnant patients) (1 - 1-dose 75+ series) 12/10/2041    Influenza Vaccine  Completed       Past Medical History:  has a past medical history of Obesity, unspecified.    Surgical History:  has a past surgical history that includes Tubal ligation; Hysterectomy; Tonsillectomy; [place] tooth extraction; Vaginal delivery; and left wrist tendonitis.    Family History: family history includes Arthritis in her mother; Cancer in her father; Diabetes in her father and mother.    Social History:  reports that she has never smoked. She has never used smokeless tobacco. She reports current alcohol use. She reports that she does not use drugs.    Current Outpatient Medications   Medication Instructions    fluticasone propionate (FLONASE) 50 mcg, Each Nostril, 2 times daily    loratadine (CLARITIN) 10 mg, Oral, Daily    meloxicam (MOBIC) 15 mg, Oral, Daily    metFORMIN (GLUCOPHAGE-XR) 500 mg, Oral, 2 times daily before meals       Patient is allergic to diclofenac, ketorolac, and tromethamine.     Patient Care Team:  Tere Gilbert FNP as PCP - General (Family  "Medicine)       Subjective:     Review of Systems    12 point review of systems conducted, negative except as stated in the history of present illness. See HPI for details.      Objective:     Visit Vitals  /73 (BP Location: Left arm, Patient Position: Sitting)   Pulse 82   Temp 98.3 °F (36.8 °C) (Oral)   Resp 18   Ht 5' 2" (1.575 m)   Wt 113 kg (249 lb 1.6 oz)   SpO2 98%   BMI 45.56 kg/m²       Physical Exam    General: Alert and oriented, No acute distress.  Head: Normocephalic, Atraumatic.  Eye: Pupils are equal, round and reactive to light, Extraocular movements are intact, Sclera non-icteric.  Ears/Nose/Throat: Normal, Mucosa moist,Clear.  Neck/Thyroid: Supple, Non-tender, No carotid bruit, No lymphadenopathy, No JVD, Full range of motion.  Respiratory: Clear to auscultation bilaterally; No wheezes, rales or rhonchi,Non-labored respirations, Symmetrical chest wall expansion.  Cardiovascular: Regular rate and rhythm, S1/S2 normal, No murmurs, rubs or gallops.  Gastrointestinal: Soft, Non-tender, Non-distended, Normal bowel sounds, No palpable organomegaly.  Musculoskeletal: Normal range of motion.  Integumentary: Warm, Dry, Intact, No suspicious lesions or rashes.  Extremities: No clubbing, cyanosis or edema  Neurologic: No focal deficits, Cranial Nerves II-XII are grossly intact, Motor strength normal upper and lower extremities, Sensory exam intact.  Psychiatric: Normal interaction, Coherent speech, Euthymic mood, Appropriate affect     Labs Reviewed:     Chemistry:  Lab Results   Component Value Date     08/02/2024    K 4.4 08/02/2024    BUN 22.3 (H) 08/02/2024    CREATININE 0.82 08/02/2024    EGFRNORACEVR >60 08/02/2024    GLUCOSE 132 (H) 08/02/2024    CALCIUM 9.2 08/02/2024    ALKPHOS 83 08/02/2024    LABPROT 7.7 08/02/2024    ALBUMIN 3.9 08/02/2024    AST 15 08/02/2024    ALT 14 08/02/2024    [id number] 37 08/02/2024    TSH 1.133 08/02/2024        Lab Results   Component Value Date    HGBA1C " 6.2 01/13/2025        Hematology:  Lab Results   Component Value Date    WBC 7.71 08/02/2024    HGB 13.5 08/02/2024    HCT 42.0 08/02/2024     08/02/2024       Lipid Panel:  Lab Results   Component Value Date    CHOL 181 08/02/2024    HDL 44 08/02/2024    .00 08/02/2024    TRIG 107 08/02/2024    TOTALCHOLEST 4 08/02/2024        Urine:  Lab Results   Component Value Date    CREATRANDUR 333.0 (H) 08/02/2024          Assessment/Plan     Assessment & Plan  Pre-diabetes  Lab Results   Component Value Date    HGBA1C 6.2 01/13/2025    HGBA1C 6.5 10/16/2024    .00 08/02/2024    CREATININE 0.82 08/02/2024      Continue   Diabetes Medications               metFORMIN (GLUCOPHAGE-XR) 500 MG ER 24hr tablet Take 1 tablet (500 mg total) by mouth 2 (two) times daily before meals.        Follow ADA Diet. Avoid soda, simple sweets, and limit rice/pasta/breads/starches (no more than 45-50 grams per meal).  Maintain healthy weight with goal BMI <30.  Exercise 5 times per week for 30 minutes per day.  Stressed importance of daily foot exams.  Stressed importance of annual dilated eye exam.    Orders:    metFORMIN (GLUCOPHAGE-XR) 500 MG ER 24hr tablet; Take 1 tablet (500 mg total) by mouth 2 (two) times daily before meals.    Chronic pain of left ankle  Stable.  Not at goal.  Start meloxicam 15 mg p.o. daily.  DC ibuprofen and other NSAIDs.  Orders:    meloxicam (MOBIC) 15 MG tablet; Take 1 tablet (15 mg total) by mouth once daily.    Obesity, unspecified class, unspecified obesity type, unspecified whether serious comorbidity present  Body mass index is 45.56 kg/m².  Goal BMI <30.  Exercise 5 times a week for 30 minutes per day.  Avoid soda, simple sugars, excessive rice, potatoes or bread. Limit fast foods and fried foods.  Choose complex carbs in moderation (example: green vegetables, beans, oatmeal). Eat plenty of fresh fruits and vegetables with lean meats daily.  Do not skip meals. Eat a balanced portion  size.  Avoid fad diets. Consider permanent healthy life style changes.       Follow up in about 6 months (around 7/17/2025) for DM. In addition to their scheduled follow up, the patient has also been instructed to follow up on as needed basis.     Future Appointments   Date Time Provider Department Center   7/17/2025  9:00 AM Tere Gilbert FNP [facility]   10/23/2025  2:30 PM Tere Gilbert FNP [facility]        JOEY Henry

## 2025-01-17 NOTE — PROGRESS NOTES
Health Maintenance Topic(s) Outreach Outcomes & Actions Taken:    Colorectal Cancer Screening - Outreach Outcomes & Actions Taken  : External Records Requested & Care Team Updated if Applicable       Additional Notes:  Request Colonoscopy Report:  Susanna Gudino in [place]

## 2025-01-17 NOTE — LETTER
.      AUTHORIZATION FOR RELEASE OF CONFIDENTIAL INFORMATION      2025      Dear Hector Kaur,    We are seeing Jennifer Jackson, date of birth 1965, in the clinic at [facility].  Tere Gilbert FNP is the patient's PCP. Jennifer Jackson has an outstanding lab/procedure at the time we reviewed his chart.  In order to help keep her health information updated, Jennifer has authorized us to request the following medical record(s):        Colonoscopy         Please fax any records to Tere Gilbert FNP's at  [phone]    If you have any questions, please contact  [facility] @ [phone]             Patient Name: Jennifer Jackson  :1965  Patient Phone #:[phone]            Jennifer Jackson  MRN: [id number]  : 1965  Age: 58 y.o.  Sex: female         Patient/Legal Guardian Signature  This signature was collected at 2024           _______________________________   Printed Name/Relationship to Patient      Consent for Examination and Treatment: I hereby authorize the providers and employees of [facility] ([facility]) to provide medical treatment/services which includes, but is not limited to, performing and administering tests and diagnostic procedures that are deemed necessary, including, but not limited to, imaging examinations, blood tests and other laboratory procedures as may be required by the hospital, clinic, or may be ordered by my physician(s) or persons working under the general and/or special instructions of my physician(s).      I understand and agree that this consent covers all authorized persons, including but not limited to physicians, residents, nurse practitioners, physicians' assistants, specialists, consultants, student nurses, and independently contracted physicians, who are called upon by the physician in charge, to carry out the diagnostic procedures and medical or surgical treatment.     I hereby authorize [facility] to retain or  dispose of any specimens or tissue, should there be such remaining from any test or procedure.     I hereby authorize and give consent for [facility] providers and employees to take photographs, images or videotapes of such diagnostic, surgical or treatment procedures of Patient as may be required by [facility] or as may be ordered by a physician. I further acknowledge and agree that [facility] may use cameras or other devices for patient monitoring.     I am aware that the practice of medicine is not an exact science, and I acknowledge that no guarantees have been made to me as to the outcome of any tests, procedures or treatment.     Authorization for Release of Information: I understand that my insurance company and/or their agents may need information necessary to make determinations about payment/reimbursement. I hereby provide authorization to release to all insurance companies, their successors, assignees, other parties with whom they may have contracted, or others acting on their behalf, that are involved with payment for any hospital and/or clinic charges incurred by the patient, any information that they request and deem necessary for payment/reimbursement, and/or quality review.  I further authorize the release of my health information to physicians or other health care practitioners on staff who are involved in my health care now and in the future, and to other health care providers, entities, or institutions for the purpose of my continued care and treatment, including referrals.     REGISTRATION AUTHORIZATION  Form No. 34689 (Rev. 3/25/2024)    Page 1 of 3                       Medicare Patient's Certification and Authorization to Release Information and Payment Request:  I certify that the information given by me in applying for payment under Title XVIII of the Social Security Act is correct. I authorize any arenas of medical or other information about me to release to the Social SecurityAdministration, or its  intermediaries or carriers, any information needed for this or a related Medicare claim. I request that payment of authorized benefits be made on my behalf.     Assignment of Insurance Benefits:   I hereby authorize any and all insurance companies, health plans, defined   benefit plans, health insurers or any entity that is or may be responsible for payment of my medical expenses to pay all hospital and medical benefits now due, and to become due and payable to me under any hospital benefits, sick benefits, injury benefits or any other benefit for services rendered to me, including Major Medical Benefits, direct to [facility] and all independently contracted physicians. I assign any and all rights that I may have against any and all insurance companies, health plans, defined benefit plans, health insurers or any entity that is or may be responsible for payment of my medical expenses, including, but not limited to any right to appeal a denial of a claim, any right to bring any action, lawsuit, administrative proceeding, or other cause of action on my behalf. I specifically assign my right to pursue litigation against any and all insurance companies, health plans, defined benefit plans, health insurers or any entity that is or may be responsible for payment of my medical expenses based upon a refusal to pay charges.            E. Valuables: It is understood and agreed that [facility] is not liable for the damage to or loss of any money, jewelry,   documents, dentures, eye glasses, hearing aids, prosthetics, or other property of value.     F. Computer Equipment: I understand and agree that should I choose to use computer equipment owned by [facility] or if I choose to access the Internet via [facility]s network, I do so at my own risk. [facility] is not responsible for any damage to my computer equipment or to any damages of any type that might arise from my loss of equipment or data.     G. Acceptance of Financial Responsibility:   I agree that in consideration of the services and   supplies that have been   or will be furnished to the patient, I am hereby obligated to pay all charges made for or on the account of the patient according to the standard rates (in effect at the time the services and supplies are delivered) established by [facility], including its Patient Financial Assistance Policy to the extent it is applicable. I understand that I am responsible for all charges, or portions thereof, not covered by insurance or other sources. Patient refunds will be distributed only after balances at all [facility] facilities are paid.     H. Communication Authorization:  I hereby authorize [facility] and its representatives, along with any billing service   or  who may work on their behalf, to contact me on   my cell phone and/or home phone using pre- recorded messages, artificial voice messages, automatic telephone dialing devices or other computer assisted technology, or by electronic      mail, text messaging, or by any other form of electronic communication. This includes, but is not limited to, appointment reminders, yearly physical exam reminders, preventive care reminders, patient campaigns, welcome calls, and calls about account balances on my account or any account on which I am listed as a guarantor. I understand I have the right to opt out of these communications at any time.      Relationship  Between  Facility and  Provider:      I understand that some, but not all, providers furnishing services to the patient are not employees or agents of [facility]. The patient is under the care and supervision of his/her attending physician, and it is the responsibility of the facility and its nursing staff to carry out the instructions of such physicians. It is the responsibility of the patient's physician/designee to obtain the patient's informed consent, when required, for medical or surgical treatment, special diagnostic or therapeutic  procedures, or hospital services rendered for the patient under the special instructions of the physician/designee.           REGISTRATION AUTHORIZATION  Form No. 24404 (Rev. 3/25/2024)    Page 2 of 3                       Immunizations: [facility] shares immunization information with state sponsored health departments to help you and your doctor keep track of your immunization records. By signing, you consent to have this information shared with the health department in your state:                                [place] - LINKS ([place] Immunization Network for Kids Statewide)                                [place] - MIIX ([place] Immunization Information eXchange)                                [place] - ImmPRINT (Immunization Patient Registry with Integrated Technology)     TERM: This authorization is valid for this and subsequent care/treatment I receive at [facility] and will remain valid unless/until revoked in writing by me.     [facility]: As used in this document, [facility] means all [facility] owned and managed facilities, including, but not limited to, all health centers, surgery centers, clinics, urgent care centers, and hospitals.         [facility] complies with applicable Federal civil rights laws and does not discriminate on the basis of race, color, national origin, age, disability, or sex.  ATENCIÓN: si habla marlonickolas, tiene a tavera disposición servicios gratuitos de asistencia lingüística. Rafi al [phone].  CHÚ Ý: N?u b?n nói Ti?ng Vi?t, có các d?ch v? h? tr? ngôn ng? mi?n phí dành cho b?n. G?i s? [phone].        REGISTRATION AUTHORIZATION  Form No. 06051 (Rev. 3/25/2024)   Page 3 of 3

## 2025-07-09 ENCOUNTER — TELEPHONE (OUTPATIENT)
Dept: FAMILY MEDICINE | Facility: CLINIC | Age: 60
End: 2025-07-09
Payer: MEDICAID

## 2025-07-09 DIAGNOSIS — R73.03 PRE-DIABETES: Primary | ICD-10-CM

## 2025-08-12 ENCOUNTER — PATIENT MESSAGE (OUTPATIENT)
Facility: CLINIC | Age: 60
End: 2025-08-12
Payer: MEDICAID